# Patient Record
(demographics unavailable — no encounter records)

---

## 2025-03-13 NOTE — IMAGING
[Right] : right foot [There are no fractures, subluxations or dislocations. No significant abnormalities are seen] : There are no fractures, subluxations or dislocations. No significant abnormalities are seen [FreeTextEntry9] : 3/9/25 no visible fractures

## 2025-03-13 NOTE — DISCUSSION/SUMMARY
[de-identified] : Continue CAM boot crutches recommended icing/elevation Ibuprofen 600mg every 6 hrs as needed

## 2025-03-13 NOTE — HISTORY OF PRESENT ILLNESS
[de-identified] : 3/13/25: Patient is a 26-year-old female complaining of right ankle pain. On 03.08.25 She states she injured herself while running her ankle rolled stepped on younger brothers foot.. She was seen at [facility] [place] XR no findings on  03.09.25. Swelling was immediate and numbness and tingling. Icing and Motrin. HX of a sprain on the right ankle age 17. She states when she walks, she feels a pulling sensation.  No further treatment. WB IN CAM BOOT / sneakers . She has been taking Motrin 600mg twice daily   [FreeTextEntry1] : RIGHT ANKLE

## 2025-03-13 NOTE — HISTORY OF PRESENT ILLNESS
[de-identified] : 3/13/25: Patient is a 26-year-old female complaining of right ankle pain. On 03.08.25 She states she injured herself while running her ankle rolled stepped on younger brothers foot.. She was seen at [facility] [place] [facility] XR no findings on  03.09.25. Swelling was immediate and numbness and tingling. Icing and Motrin. HX of a sprain on the right ankle age 17. She states when she walks, she feels a pulling sensation.  No further treatment. WB IN CAM BOOT / sneakers . She has been taking Motrin 600mg twice daily   [FreeTextEntry1] : RIGHT ANKLE

## 2025-03-13 NOTE — DISCUSSION/SUMMARY
[de-identified] : Continue CAM boot crutches recommended icing/elevation Ibuprofen 600mg every 6 hrs as needed

## 2025-03-13 NOTE — PHYSICAL EXAM
[Right] : right foot and ankle [2+] : dorsalis pedis pulse: 2+ [5th] : 5th [] : generalized ligamentous laxity

## 2025-03-27 NOTE — HISTORY OF PRESENT ILLNESS
[FreeTextEntry1] : annual  [de-identified] : 26-year-old female here for annual well visit. patient on Prozac and Topamax sees psyc and therapist. went to gyn dx with pcos suggested birth control does not want at this time. interested in weight loss medications went over zepbound risks and benefits how to inject patient verbalizes understanding. no complaints today

## 2025-03-27 NOTE — HEALTH RISK ASSESSMENT
[Good] : ~his/her~ current health as good [Fair] :  ~his/her~ mood as fair [Yes] : Yes [Monthly or less (1 pt)] : Monthly or less (1 point) [1 or 2 (0 pts)] : 1 or 2 (0 points) [Never (0 pts)] : Never (0 points) [Never] : Never [Patient reported PAP Smear was normal] : Patient reported PAP Smear was normal [With Family] : lives with family [Employed] : employed [Single] : single [FreeTextEntry2] :

## 2025-04-01 NOTE — HISTORY OF PRESENT ILLNESS
[de-identified] : 3/13/25: Patient is a 26-year-old female complaining of right ankle pain. On 03.08.25 She states she injured herself while running her ankle rolled stepped on younger brothers foot.. She was seen at [facility] [place] XR no findings on  03.09.25. Swelling was immediate and numbness and tingling. Icing and Motrin. HX of a sprain on the right ankle age 17. She states when she walks, she feels a pulling sensation.  No further treatment. WB IN CAM BOOT / sneakers . She has been taking Motrin 600mg twice daily   04.01.25: Patient is following up on the right ankle. Pain is moderate with driving. Shooting along the lateral ankle. Motrin once a day. WB in tall cam boot  [FreeTextEntry1] : RIGHT ANKLE

## 2025-04-01 NOTE — PHYSICAL EXAM
[Right] : right foot and ankle [5th] : 5th [2+] : dorsalis pedis pulse: 2+ [] : no metatarsal tenderness [de-identified] : cam boot

## 2025-07-23 NOTE — HISTORY OF PRESENT ILLNESS
[Home] : at home, [unfilled] , at the time of the visit. [Medical Office: ([facility] [place])___] : at the medical office located in  [Telehealth (audio & video)] : This visit was provided via telehealth using real-time 2-way audio visual technology. [Verbal consent obtained from patient] : the patient, [unfilled] [FreeTextEntry1] : follow up  [de-identified] : 27 year old female calls in for follow up. patient states first week she had some side effects on phentermine but it now has subsided and has no side effects no chest pain no palpitations no gi issues. patient states it is helping she lost 15lbs but states she feels like it is not working at times and not as strong. patient would like to increase dosage. no compliants